# Patient Record
Sex: MALE | Race: WHITE | Employment: UNEMPLOYED | URBAN - METROPOLITAN AREA
[De-identification: names, ages, dates, MRNs, and addresses within clinical notes are randomized per-mention and may not be internally consistent; named-entity substitution may affect disease eponyms.]

---

## 2017-07-10 ENCOUNTER — APPOINTMENT (OUTPATIENT)
Dept: OTHER | Facility: HOSPITAL | Age: 47
End: 2017-07-10
Attending: ORTHOPAEDIC SURGERY

## 2019-08-03 ENCOUNTER — OFFICE VISIT (OUTPATIENT)
Dept: INTERNAL MEDICINE CLINIC | Facility: CLINIC | Age: 49
End: 2019-08-03

## 2019-08-03 VITALS
DIASTOLIC BLOOD PRESSURE: 82 MMHG | HEART RATE: 71 BPM | OXYGEN SATURATION: 99 % | RESPIRATION RATE: 17 BRPM | SYSTOLIC BLOOD PRESSURE: 118 MMHG | BODY MASS INDEX: 33.62 KG/M2 | HEIGHT: 69 IN | WEIGHT: 227 LBS

## 2019-08-03 DIAGNOSIS — R31.29 MICROSCOPIC HEMATURIA: ICD-10-CM

## 2019-08-03 DIAGNOSIS — I10 ESSENTIAL HYPERTENSION: Primary | ICD-10-CM

## 2019-08-03 DIAGNOSIS — E78.2 MIXED HYPERLIPIDEMIA: ICD-10-CM

## 2019-08-03 PROBLEM — R04.0 EPISTAXIS: Status: ACTIVE | Noted: 2019-08-03

## 2019-08-03 LAB
ANION GAP SERPL CALC-SCNC: 7 MMOL/L (ref 0–18)
BUN BLD-MCNC: 20 MG/DL (ref 7–18)
BUN/CREAT SERPL: 20.2 (ref 10–20)
CALCIUM BLD-MCNC: 8.8 MG/DL (ref 8.5–10.1)
CHLORIDE SERPL-SCNC: 107 MMOL/L (ref 98–112)
CHOLEST SMN-MCNC: 208 MG/DL (ref ?–200)
CO2 SERPL-SCNC: 26 MMOL/L (ref 21–32)
CREAT BLD-MCNC: 0.99 MG/DL (ref 0.7–1.3)
GLUCOSE BLD-MCNC: 97 MG/DL (ref 70–99)
HDLC SERPL-MCNC: 44 MG/DL (ref 40–59)
LDLC SERPL CALC-MCNC: 136 MG/DL (ref ?–100)
NONHDLC SERPL-MCNC: 164 MG/DL (ref ?–130)
OSMOLALITY SERPL CALC.SUM OF ELEC: 293 MOSM/KG (ref 275–295)
PATIENT FASTING: YES
PATIENT FASTING: YES
POTASSIUM SERPL-SCNC: 4.1 MMOL/L (ref 3.5–5.1)
SODIUM SERPL-SCNC: 140 MMOL/L (ref 136–145)
TRIGL SERPL-MCNC: 138 MG/DL (ref 30–149)
VLDLC SERPL CALC-MCNC: 28 MG/DL (ref 0–30)

## 2019-08-03 PROCEDURE — 80061 LIPID PANEL: CPT | Performed by: FAMILY MEDICINE

## 2019-08-03 PROCEDURE — 99213 OFFICE O/P EST LOW 20 MIN: CPT | Performed by: FAMILY MEDICINE

## 2019-08-03 PROCEDURE — 80048 BASIC METABOLIC PNL TOTAL CA: CPT | Performed by: FAMILY MEDICINE

## 2019-08-03 RX ORDER — OXYMETAZOLINE HCL 0.05 G/100ML
2 SPRAY NASAL
COMMUNITY
Start: 2019-08-01 | End: 2020-11-04 | Stop reason: ALTCHOICE

## 2019-08-03 RX ORDER — LOVASTATIN 20 MG/1
20 TABLET ORAL NIGHTLY
Qty: 90 TABLET | Refills: 3 | Status: SHIPPED | OUTPATIENT
Start: 2019-08-03 | End: 2020-12-02

## 2019-08-03 RX ORDER — ECHINACEA PURPUREA EXTRACT 125 MG
1 TABLET ORAL
COMMUNITY
Start: 2019-08-01 | End: 2020-11-04 | Stop reason: ALTCHOICE

## 2019-08-03 RX ORDER — ENALAPRIL MALEATE 10 MG/1
10 TABLET ORAL DAILY
Qty: 90 TABLET | Refills: 3 | Status: SHIPPED | OUTPATIENT
Start: 2019-08-03 | End: 2020-10-20

## 2019-08-03 NOTE — PROGRESS NOTES
CC:  ER F/U (Pt presents to clinic for ER f/up-->epistaxis. Was seen @ [facility] then sent to [facility] and admitted. )      Hx of CC:  S/P NOSE BLEED 4 DAYS AGO-->INITIALLY PACKED AT [facility] ER, THEN A DAY LATER BLED AGAIN AND SEEN AT [facility] ER.   CURRENTLY N Date: 8/3/2020      Lipid Panel [E]

## 2019-08-16 ENCOUNTER — OFFICE VISIT (OUTPATIENT)
Dept: INTERNAL MEDICINE CLINIC | Facility: CLINIC | Age: 49
End: 2019-08-16

## 2019-08-16 VITALS
DIASTOLIC BLOOD PRESSURE: 82 MMHG | SYSTOLIC BLOOD PRESSURE: 140 MMHG | BODY MASS INDEX: 33.62 KG/M2 | HEART RATE: 99 BPM | HEIGHT: 69 IN | OXYGEN SATURATION: 98 % | WEIGHT: 227 LBS

## 2019-08-16 DIAGNOSIS — R04.0 EPISTAXIS: Primary | ICD-10-CM

## 2019-08-16 DIAGNOSIS — Z86.69 H/O TENSION HEADACHE: ICD-10-CM

## 2019-08-16 DIAGNOSIS — I10 ESSENTIAL HYPERTENSION: ICD-10-CM

## 2019-08-16 PROCEDURE — 99212 OFFICE O/P EST SF 10 MIN: CPT | Performed by: FAMILY MEDICINE

## 2019-08-17 NOTE — PROGRESS NOTES
CC:  Nose Problem (C/o left sided nostril bleeding. )      Hx of CC:  RESIDUAL MILD BLOOD ON NOSE--NOW ON LEFT SIDE--OFF AND ON (NO FURTHER HEAVY BLEED)  EPISODIC JAW/TEMPORAL ACHE/HEADACHE LATER IN THE AFTERNOONS    Vitals:    08/16/19  1306   BP: 140/82

## 2020-10-20 DIAGNOSIS — I10 ESSENTIAL HYPERTENSION: ICD-10-CM

## 2020-10-20 RX ORDER — ENALAPRIL MALEATE 10 MG/1
10 TABLET ORAL DAILY
Qty: 30 TABLET | Refills: 0 | Status: SHIPPED | OUTPATIENT
Start: 2020-10-20 | End: 2020-11-04

## 2020-11-04 ENCOUNTER — OFFICE VISIT (OUTPATIENT)
Dept: INTERNAL MEDICINE CLINIC | Facility: CLINIC | Age: 50
End: 2020-11-04

## 2020-11-04 VITALS
RESPIRATION RATE: 18 BRPM | DIASTOLIC BLOOD PRESSURE: 100 MMHG | HEIGHT: 69 IN | HEART RATE: 81 BPM | WEIGHT: 238 LBS | SYSTOLIC BLOOD PRESSURE: 152 MMHG | BODY MASS INDEX: 35.25 KG/M2 | OXYGEN SATURATION: 98 %

## 2020-11-04 DIAGNOSIS — Z12.5 SCREENING FOR MALIGNANT NEOPLASM OF PROSTATE: ICD-10-CM

## 2020-11-04 DIAGNOSIS — E78.2 MIXED HYPERLIPIDEMIA: ICD-10-CM

## 2020-11-04 DIAGNOSIS — K64.8 INTERNAL HEMORRHOID: ICD-10-CM

## 2020-11-04 DIAGNOSIS — Z28.21 INFLUENZA VACCINATION DECLINED BY PATIENT: ICD-10-CM

## 2020-11-04 DIAGNOSIS — L29.0 RECTAL ITCHING: ICD-10-CM

## 2020-11-04 DIAGNOSIS — R30.0 DYSURIA: ICD-10-CM

## 2020-11-04 DIAGNOSIS — R31.29 OTHER MICROSCOPIC HEMATURIA: ICD-10-CM

## 2020-11-04 DIAGNOSIS — Z12.11 SCREENING FOR MALIGNANT NEOPLASM OF COLON: ICD-10-CM

## 2020-11-04 DIAGNOSIS — I10 ESSENTIAL HYPERTENSION: Primary | ICD-10-CM

## 2020-11-04 DIAGNOSIS — Z00.00 ADULT GENERAL MEDICAL EXAMINATION: ICD-10-CM

## 2020-11-04 PROCEDURE — 87086 URINE CULTURE/COLONY COUNT: CPT | Performed by: NURSE PRACTITIONER

## 2020-11-04 PROCEDURE — 81003 URINALYSIS AUTO W/O SCOPE: CPT | Performed by: NURSE PRACTITIONER

## 2020-11-04 PROCEDURE — 3008F BODY MASS INDEX DOCD: CPT | Performed by: NURSE PRACTITIONER

## 2020-11-04 PROCEDURE — 3077F SYST BP >= 140 MM HG: CPT | Performed by: NURSE PRACTITIONER

## 2020-11-04 PROCEDURE — 99214 OFFICE O/P EST MOD 30 MIN: CPT | Performed by: NURSE PRACTITIONER

## 2020-11-04 PROCEDURE — 3080F DIAST BP >= 90 MM HG: CPT | Performed by: NURSE PRACTITIONER

## 2020-11-04 RX ORDER — AMLODIPINE BESYLATE 5 MG/1
5 TABLET ORAL DAILY
Qty: 30 TABLET | Refills: 1 | Status: SHIPPED | OUTPATIENT
Start: 2020-11-04 | End: 2020-12-02

## 2020-11-04 RX ORDER — ENALAPRIL MALEATE 10 MG/1
10 TABLET ORAL DAILY
Qty: 90 TABLET | Refills: 0 | Status: SHIPPED | OUTPATIENT
Start: 2020-11-04 | End: 2021-02-17

## 2020-11-04 NOTE — PROGRESS NOTES
Chief Complaint:   Patient presents with:  HTN: Pt presents to clinic for HTN and medication refill. Lab: Would like PSA levels checked as he has never had it. Rectal Problem: Itching x months.        HPI:   This is a 48year old male coming in for [facility] Date   • Other and unspecified hyperlipidemia      Past Surgical History:   Procedure Laterality Date   • OTHER      right arm surgery     Social History:  Social History    Tobacco Use      Smoking status: Never Smoker      Smokeless tobacco: Never Used syncope, ataxia, numbness or tingling in the extremities,change in bowel or bladder control. HEMATOLOGIC:  Denies anemia, bleeding or bruising, enlarged lymph nodes. PSYCHIATRIC:  Denies depression or anxiety. Denies suicidal thoughts.   ENDOCRINOLOGIC: hypertension  -BP above goal. Recheck BP similar. Will add amlodipine 5mg PO daily and continue enalapril. Patient to return in 2-3 weeks for recheck. Side effects of amlodipine discussed. -Recommended low-salt diet, regular exercise.   - CBC WITH ANNA 25 MG Rectal Suppos 28 suppository 0     Sig: Place 1 suppository (25 mg total) rectally 2 (two) times daily for 14 days.          Problem List:  Patient Active Problem List:     Mixed hyperlipidemia     Essential hypertension     Epistaxis     H/O tension

## 2020-11-04 NOTE — PATIENT INSTRUCTIONS
-Amlodipine 1 tablet by mouth every morning  -Follow-up in 2-3 weeks so we can check your blood pressure  -Anusol suppository twice daily x 2 weeks for rectal itching/hemorrhoids  -Prevent constipation: Drink plenty of water, eat fiber in the diet (fruit Nathan last reviewed this educational content on 11/1/2019  © 0348-5408 [employer]. 140[address] [facility], [facility] [place]. All rights reserved. This information is not intended as a substitute for professional medical care.  Always foll

## 2020-11-09 ENCOUNTER — TELEPHONE (OUTPATIENT)
Dept: INTERNAL MEDICINE CLINIC | Facility: CLINIC | Age: 50
End: 2020-11-09

## 2020-11-09 NOTE — TELEPHONE ENCOUNTER
patient called back    informed URINE test results    Voiced understanding. No questions at this time.

## 2020-12-02 ENCOUNTER — OFFICE VISIT (OUTPATIENT)
Dept: INTERNAL MEDICINE CLINIC | Facility: CLINIC | Age: 50
End: 2020-12-02

## 2020-12-02 VITALS
RESPIRATION RATE: 17 BRPM | SYSTOLIC BLOOD PRESSURE: 122 MMHG | BODY MASS INDEX: 34.66 KG/M2 | OXYGEN SATURATION: 98 % | HEIGHT: 69 IN | WEIGHT: 234 LBS | DIASTOLIC BLOOD PRESSURE: 84 MMHG | HEART RATE: 93 BPM

## 2020-12-02 DIAGNOSIS — I10 ESSENTIAL HYPERTENSION: ICD-10-CM

## 2020-12-02 DIAGNOSIS — Z28.21 INFLUENZA VACCINATION DECLINED BY PATIENT: Primary | ICD-10-CM

## 2020-12-02 DIAGNOSIS — Z00.00 ADULT GENERAL MEDICAL EXAMINATION: ICD-10-CM

## 2020-12-02 DIAGNOSIS — Z12.5 SCREENING FOR MALIGNANT NEOPLASM OF PROSTATE: ICD-10-CM

## 2020-12-02 DIAGNOSIS — E78.2 MIXED HYPERLIPIDEMIA: ICD-10-CM

## 2020-12-02 PROCEDURE — 3079F DIAST BP 80-89 MM HG: CPT | Performed by: NURSE PRACTITIONER

## 2020-12-02 PROCEDURE — 80050 GENERAL HEALTH PANEL: CPT | Performed by: NURSE PRACTITIONER

## 2020-12-02 PROCEDURE — 80061 LIPID PANEL: CPT | Performed by: NURSE PRACTITIONER

## 2020-12-02 PROCEDURE — 3008F BODY MASS INDEX DOCD: CPT | Performed by: NURSE PRACTITIONER

## 2020-12-02 PROCEDURE — 84153 ASSAY OF PSA TOTAL: CPT | Performed by: NURSE PRACTITIONER

## 2020-12-02 PROCEDURE — 3074F SYST BP LT 130 MM HG: CPT | Performed by: NURSE PRACTITIONER

## 2020-12-02 PROCEDURE — 99213 OFFICE O/P EST LOW 20 MIN: CPT | Performed by: NURSE PRACTITIONER

## 2020-12-02 RX ORDER — AMLODIPINE BESYLATE 5 MG/1
5 TABLET ORAL DAILY
Qty: 90 TABLET | Refills: 1 | Status: SHIPPED | OUTPATIENT
Start: 2020-12-02 | End: 2021-02-17

## 2020-12-02 RX ORDER — LOVASTATIN 20 MG/1
20 TABLET ORAL NIGHTLY
Qty: 90 TABLET | Refills: 1 | Status: SHIPPED | OUTPATIENT
Start: 2020-12-02 | End: 2021-09-21

## 2020-12-02 NOTE — PROGRESS NOTES
Chief Complaint:   Patient presents with: Follow - Up: Pt presents to clinic for 3wk f/up. Fasting for labs. HPI:   This is a 48year old male coming in for follow-up on hypertension.  He started amlodipine 4 weeks ago and BP is much better-controlle tablet 1   • amLODIPine Besylate 5 MG Oral Tab Take 1 tablet (5 mg total) by mouth daily. 90 tablet 1   • Enalapril Maleate 10 MG Oral Tab Take 1 tablet (10 mg total) by mouth daily.  90 tablet 0      Counseling given: Not Answered       REVIEW OF SYSTEMS: months.  -Lifestyle modifications reviewed with patient including low salt/low fat diet, exercise regularly. - CBC WITH DIFFERENTIAL WITH PLATELET  - COMP METABOLIC PANEL (14)  - amLODIPine Besylate 5 MG Oral Tab;  Take 1 tablet (5 mg total) by mouth john

## 2020-12-02 NOTE — PATIENT INSTRUCTIONS
-Preparation H over-the-counter as needed for rectal itching/hemorrhoids    Alimentos con bajo contenido de sal  El consumo de sal (sodio) puede causar pola retención excesiva de agua. El agua adicional obliga al corazón a trabajar más.  Las comidas en con · Pretzels, kapil fritas, galletas saladas y nasir secos (a menos que la etiqueta indique que no contienen cordell)    © 0888-8970 [employer] [address]. 1407 [facility], [address]. Todos los derechos reservados.  Esta información no pretend ? Raf de Hussain. Un baño de asiento consiste en sentarse en pola meryl que contiene algunas pulgadas de agua tibia. Tenga cuidado de que el agua no esté quick caliente que pueda quemarse. (Pruébela antes de sentarse).  Empape la feli afectada de unos 10 a 15 Cuándo debe buscar atención médica  Llame a valencia proveedor de atención médica de inmediato si presenta alguno de estos síntomas:  · Aumento del sangrado del recto  · Más dolor alrededor del recto o el ano  · Debilidad o mareos  Llame al 911  Llame al 911ante · Adopte buenos hábitos intestinales. Vaya al baño cuando valencia cuerpo lo necesita; no ignore las ganas de ir, ya que posponer la evacuación puede provocar estreñimiento, heces duras y esfuerzos excesivos. Además, evite leer mientras esté en el inodoro.  Siént · La fibra soluble, que está presente en ciertos alimentos ella el salvado de darnell. Aunque la fibra soluble es beneficiosa, quizás no alivie el estreñimiento en la misma medida que los alimentos que contienen fibra insoluble.   Annie más agua  Junto con Sandy Galicia · Comience el día con un desayuno rico en fibra. Coma cereal de salvado de clarke junto con un plátano rebanado, o pola tostada de clarke integral con mantequilla de cacahuete.   · Coma palitos de zanahorias ella bocadillos: son fáciles de preparar, muy sabros

## 2021-02-16 ENCOUNTER — TELEPHONE (OUTPATIENT)
Dept: INTERNAL MEDICINE CLINIC | Facility: CLINIC | Age: 51
End: 2021-02-16

## 2021-02-16 DIAGNOSIS — I10 ESSENTIAL HYPERTENSION: ICD-10-CM

## 2021-02-16 NOTE — TELEPHONE ENCOUNTER
Patient is calling for a medication refill on Enalapril Maleate 10 MG Oral Tab and amLODIPine Besylate 5 MG Oral Tab as he is on his last two pills for each medication.  Patient would like a call back if this is possible as he doesn't have a f/u appointment

## 2021-02-17 RX ORDER — AMLODIPINE BESYLATE 5 MG/1
5 TABLET ORAL DAILY
Qty: 90 TABLET | Refills: 1 | Status: SHIPPED | OUTPATIENT
Start: 2021-02-17 | End: 2021-09-21 | Stop reason: ALTCHOICE

## 2021-02-17 RX ORDER — ENALAPRIL MALEATE 10 MG/1
10 TABLET ORAL DAILY
Qty: 90 TABLET | Refills: 0 | Status: SHIPPED | OUTPATIENT
Start: 2021-02-17 | End: 2021-06-10

## 2021-03-31 ENCOUNTER — OFFICE VISIT (OUTPATIENT)
Dept: FAMILY MEDICINE CLINIC | Facility: CLINIC | Age: 51
End: 2021-03-31

## 2021-03-31 VITALS
HEART RATE: 96 BPM | SYSTOLIC BLOOD PRESSURE: 130 MMHG | RESPIRATION RATE: 16 BRPM | HEIGHT: 68 IN | BODY MASS INDEX: 34.86 KG/M2 | OXYGEN SATURATION: 98 % | DIASTOLIC BLOOD PRESSURE: 88 MMHG | WEIGHT: 230 LBS | TEMPERATURE: 96 F

## 2021-03-31 DIAGNOSIS — J02.9 SORE THROAT: Primary | ICD-10-CM

## 2021-03-31 DIAGNOSIS — J02.0 ACUTE STREPTOCOCCAL PHARYNGITIS: ICD-10-CM

## 2021-03-31 LAB
CONTROL LINE PRESENT WITH A CLEAR BACKGROUND (YES/NO): YES YES/NO
KIT LOT #: ABNORMAL NUMERIC
STREP GRP A CUL-SCR: POSITIVE

## 2021-03-31 PROCEDURE — 87880 STREP A ASSAY W/OPTIC: CPT | Performed by: PHYSICIAN ASSISTANT

## 2021-03-31 PROCEDURE — 99213 OFFICE O/P EST LOW 20 MIN: CPT | Performed by: PHYSICIAN ASSISTANT

## 2021-03-31 PROCEDURE — 3079F DIAST BP 80-89 MM HG: CPT | Performed by: PHYSICIAN ASSISTANT

## 2021-03-31 PROCEDURE — 3008F BODY MASS INDEX DOCD: CPT | Performed by: PHYSICIAN ASSISTANT

## 2021-03-31 PROCEDURE — 3075F SYST BP GE 130 - 139MM HG: CPT | Performed by: PHYSICIAN ASSISTANT

## 2021-03-31 RX ORDER — AMOXICILLIN 875 MG/1
875 TABLET, COATED ORAL 2 TIMES DAILY
Qty: 20 TABLET | Refills: 0 | Status: SHIPPED | OUTPATIENT
Start: 2021-03-31 | End: 2021-04-10

## 2021-03-31 NOTE — PATIENT INSTRUCTIONS
Faringitis por estreptococos (confirmada)    Woodruff prueba farzaneh positiva a la infección por estreptococos. Se trata de pola enfermedad contagiosa.  Se puede contagiar al toser, al besar, al compartir vasos o cubiertos o al tocar a otras personas después de habe útil hacerlo antes de las comidas.   · Lo mejor son los alimentos blandos y los líquidos fríos o calientes. No consuma alimentos salados ni picantes.     Visitas de control  Asista a visitas de seguimiento con valencia proveedor de atención médica o nuestro perso

## 2021-03-31 NOTE — PROGRESS NOTES
CHIEF COMPLAINT:   Patient presents with:  Sore Throat    HPI:   Ashvin Guerrero is a 46year old male presents to clinic with complaint of sore throat. Patient has had for 3-4 days.    Reports: no nasal congestion, no cough  Reports no chills, no fe nostrils patent, no nasal mucus, nasal mucosa pink and  Non inflamed  THROAT: oral mucosa pink, moist. Posterior pharynx erythematous and injected. No exudates. Tonsils 2+/4. Uvula is midline. No trismus, hoarseness, muffled voice, or stridor.     NECK: valencia después de haberse tocado la boca o la nariz.  Los síntomas incluyen dolor de garganta que empeora al tragar, dolor en todo el cuerpo, dolor de lindsey, ganglios linfáticos inflamados en la parte de adelante del angel, amígdalas degroot e inflamadas, a veces o nuestro personal médico si no empieza a sentirse mejor ray la semana próxima.    Cuándo buscar atención médica  Llame al proveedor de atención médica o solicite atención médica de inmediato en cualquiera de los siguientes casos:   · Ja de 100,4 °F

## 2021-04-28 ENCOUNTER — HOSPITAL ENCOUNTER (INPATIENT)
Facility: HOSPITAL | Age: 51
LOS: 8 days | Discharge: HOME | DRG: 177 | End: 2021-05-06
Attending: EMERGENCY MEDICINE | Admitting: HOSPITALIST
Payer: OTHER GOVERNMENT

## 2021-04-28 ENCOUNTER — APPOINTMENT (OUTPATIENT)
Dept: GENERAL RADIOLOGY | Facility: HOSPITAL | Age: 51
DRG: 177 | End: 2021-04-28
Attending: EMERGENCY MEDICINE
Payer: OTHER GOVERNMENT

## 2021-04-28 DIAGNOSIS — U07.1 PNEUMONIA DUE TO 2019 NOVEL CORONAVIRUS: Primary | ICD-10-CM

## 2021-04-28 DIAGNOSIS — R09.02 HYPOXIA: ICD-10-CM

## 2021-04-28 DIAGNOSIS — J12.82 PNEUMONIA DUE TO 2019 NOVEL CORONAVIRUS: Primary | ICD-10-CM

## 2021-04-28 PROCEDURE — XW0DXM6 INTRODUCTION OF BARICITINIB INTO MOUTH AND PHARYNX, EXTERNAL APPROACH, NEW TECHNOLOGY GROUP 6: ICD-10-PCS | Performed by: INTERNAL MEDICINE

## 2021-04-28 PROCEDURE — 71045 X-RAY EXAM CHEST 1 VIEW: CPT | Performed by: EMERGENCY MEDICINE

## 2021-04-28 PROCEDURE — 3E0333Z INTRODUCTION OF ANTI-INFLAMMATORY INTO PERIPHERAL VEIN, PERCUTANEOUS APPROACH: ICD-10-PCS | Performed by: EMERGENCY MEDICINE

## 2021-04-28 PROCEDURE — 99223 1ST HOSP IP/OBS HIGH 75: CPT | Performed by: HOSPITALIST

## 2021-04-28 PROCEDURE — 99222 1ST HOSP IP/OBS MODERATE 55: CPT | Performed by: INTERNAL MEDICINE

## 2021-04-28 PROCEDURE — XW033E5 INTRODUCTION OF REMDESIVIR ANTI-INFECTIVE INTO PERIPHERAL VEIN, PERCUTANEOUS APPROACH, NEW TECHNOLOGY GROUP 5: ICD-10-PCS | Performed by: EMERGENCY MEDICINE

## 2021-04-28 RX ORDER — DEXAMETHASONE SODIUM PHOSPHATE 10 MG/ML
6 INJECTION, SOLUTION INTRAMUSCULAR; INTRAVENOUS ONCE
Status: COMPLETED | OUTPATIENT
Start: 2021-04-28 | End: 2021-04-28

## 2021-04-28 RX ORDER — ZINC SULFATE 50(220)MG
220 CAPSULE ORAL 2 TIMES DAILY
Status: DISCONTINUED | OUTPATIENT
Start: 2021-04-28 | End: 2021-05-06

## 2021-04-28 RX ORDER — PROCHLORPERAZINE EDISYLATE 5 MG/ML
5 INJECTION INTRAMUSCULAR; INTRAVENOUS EVERY 8 HOURS PRN
Status: DISCONTINUED | OUTPATIENT
Start: 2021-04-28 | End: 2021-05-06

## 2021-04-28 RX ORDER — DEXAMETHASONE SODIUM PHOSPHATE 4 MG/ML
6 VIAL (ML) INJECTION DAILY
Status: DISCONTINUED | OUTPATIENT
Start: 2021-04-28 | End: 2021-05-06

## 2021-04-28 RX ORDER — ENOXAPARIN SODIUM 100 MG/ML
40 INJECTION SUBCUTANEOUS DAILY
Status: DISCONTINUED | OUTPATIENT
Start: 2021-04-28 | End: 2021-05-06

## 2021-04-28 RX ORDER — ACETAMINOPHEN 325 MG/1
650 TABLET ORAL EVERY 6 HOURS PRN
Status: DISCONTINUED | OUTPATIENT
Start: 2021-04-28 | End: 2021-05-06

## 2021-04-28 RX ORDER — GUAIFENESIN 600 MG
600 TABLET, EXTENDED RELEASE 12 HR ORAL 2 TIMES DAILY
Status: DISCONTINUED | OUTPATIENT
Start: 2021-04-28 | End: 2021-05-01

## 2021-04-28 RX ORDER — BENZONATATE 100 MG/1
200 CAPSULE ORAL 3 TIMES DAILY PRN
Status: DISCONTINUED | OUTPATIENT
Start: 2021-04-28 | End: 2021-05-06

## 2021-04-28 RX ORDER — DEXAMETHASONE 6 MG/1
6 TABLET ORAL DAILY
Status: DISCONTINUED | OUTPATIENT
Start: 2021-04-28 | End: 2021-05-06

## 2021-04-28 RX ORDER — ONDANSETRON 2 MG/ML
4 INJECTION INTRAMUSCULAR; INTRAVENOUS EVERY 6 HOURS PRN
Status: DISCONTINUED | OUTPATIENT
Start: 2021-04-28 | End: 2021-05-06

## 2021-04-28 RX ORDER — ALBUTEROL SULFATE 90 UG/1
4 AEROSOL, METERED RESPIRATORY (INHALATION) EVERY 4 HOURS PRN
Status: DISCONTINUED | OUTPATIENT
Start: 2021-04-28 | End: 2021-05-06

## 2021-04-28 RX ORDER — ASCORBIC ACID 500 MG
1000 TABLET ORAL DAILY
Status: DISCONTINUED | OUTPATIENT
Start: 2021-04-28 | End: 2021-05-06

## 2021-04-28 NOTE — ED PROVIDER NOTES
Patient Seen in: [facility] AND [facility] Emergency Department      History   Patient presents with:  Difficulty Breathing  Covid    Stated Complaint: sob    HPI/Subjective:   HPI    49-year-old male with history of hyperlipidemia and hypertension presents with sounds normal  Neurological: Speech normal.  Moving extremities x4. Skin: warm and dry, no rashes. Musculoskeletal: neck is supple non tender        Extremities are symmetrical, full range of motion. No leg edema or tenderness noted.   Psychiatric: patie Discussed with Dr. Neymar Quintero, hospitalist.  Also discussed with Dr. Nhan Haynes, pulmonology. Also discussed with Dr. Yenny Knight, infectious disease.     I spent a total of 27 minutes of critical care time in obtaining history, performing a physical exam, bedside

## 2021-04-29 PROCEDURE — 99233 SBSQ HOSP IP/OBS HIGH 50: CPT | Performed by: PHYSICIAN ASSISTANT

## 2021-04-29 PROCEDURE — 99233 SBSQ HOSP IP/OBS HIGH 50: CPT | Performed by: HOSPITALIST

## 2021-04-29 PROCEDURE — XW033H5 INTRODUCTION OF TOCILIZUMAB INTO PERIPHERAL VEIN, PERCUTANEOUS APPROACH, NEW TECHNOLOGY GROUP 5: ICD-10-PCS | Performed by: PHYSICIAN ASSISTANT

## 2021-04-29 RX ORDER — AMLODIPINE BESYLATE 5 MG/1
5 TABLET ORAL DAILY
Status: DISCONTINUED | OUTPATIENT
Start: 2021-04-29 | End: 2021-04-29

## 2021-04-29 RX ORDER — ENALAPRIL MALEATE 10 MG/1
10 TABLET ORAL DAILY
Status: DISCONTINUED | OUTPATIENT
Start: 2021-04-29 | End: 2021-05-06

## 2021-04-29 RX ORDER — ECHINACEA PURPUREA EXTRACT 125 MG
1 TABLET ORAL
Status: DISCONTINUED | OUTPATIENT
Start: 2021-04-29 | End: 2021-05-06

## 2021-04-29 RX ORDER — AMLODIPINE BESYLATE 5 MG/1
5 TABLET ORAL DAILY
Status: DISCONTINUED | OUTPATIENT
Start: 2021-04-29 | End: 2021-05-06

## 2021-04-29 NOTE — CONSULTS
[facility] ID CONSULT NOTE    Amber Mention Patient Status:  Inpatient    3/15/1970 MRN [id number]   Location [facility] Attending Elder Veras MD   Hosp Day # 1 PCP Angela Lewis DO       [facility] for [facility] SERVICES, [facility] (ZINCATE) cap 220 mg, 220 mg, Oral, BID  •  Vitamin D3 (Cholecalciferol) cap/tab 2,000 Units, 2,000 Units, Oral, Daily  •  ascorbic acid (VITAMIN C) tab 1,000 mg, 1,000 mg, Oral, Daily  •  dexamethasone (DECADRON) tab 6 mg, 6 mg, Oral, Daily **OR** dexamet 0.4 0.4   TP 7.8 7.7       Microbiology: Reviewed in EMR    Radiology: Reviewed    ASSESSMENT:    Antibiotics: OFF    46year old , [address] speaking male with a history of obesity, HTN who presented to [address] [place] [address] ED on 4/28 with increased shortness of jerod

## 2021-04-29 NOTE — ED QUICK NOTES
Orders for admission, patient is aware of plan and ready to go upstairs. LOC: AOx3. Proned on 10L HFNC.    COVID positive 4/24/21    ACCESS: 18g left AC    Any questions, please call ED RN Michelle Dunne  at extension [phone]

## 2021-04-29 NOTE — PLAN OF CARE
Pt receiving 10-15L HFNC. Pt short of breath with exertion, HR going up with exertion. Desats down to mid 70's when standing up and walking. Recovers upon resting. Therapeutic proning position done, and O2 sats improved and was 90's.  Encouraged use of sharonda

## 2021-04-29 NOTE — PLAN OF CARE
Pt from home with family. 10L O2 per HFNC. Iv remdesiver, decadron, and baricitnib started, no CCP per pulm. PT prones as tolerated.  PT severely     Problem: Patient Centered Care  Goal: Patient preferences are identified and integrated in the patient's p retention  Outcome: Not Progressing     Problem: PAIN - ADULT  Goal: Verbalizes/displays adequate comfort level or patient's stated pain goal  Description: INTERVENTIONS:  - Encourage pt to monitor pain and request assistance  - Assess pain using appropria Progressing

## 2021-04-29 NOTE — PLAN OF CARE
IV remdesiver, decadron, and baricitnib started. No CCP per pulm.  PT gets very short of breath with any exertion including moving in bed and coughing- pulse ox 73% lowest. 10L O2 on 88-93% at rest.      Problem: Patient Centered Care  Goal: Patient prefere Cessation handout, if applicable  - Encourage broncho-pulmonary hygiene including cough, deep breathe, Incentive Spirometry  - Assess the need for suctioning and perform as needed  - Assess and instruct to report SOB or any respiratory difficulty  - Respir partner in discharge planning  - Arrange for needed discharge resources and transportation as appropriate  - Identify discharge learning needs (meds, wound care, etc)  - Arrange for interpreters to assist at discharge as needed  - Consider post-discharge p

## 2021-04-29 NOTE — PROGRESS NOTES
[facility]    Progress Note    Queenie Snyder Patient Status:  Inpatient    3/15/1970 MRN [id number]   Location [facility] Attending Karla Astudillo MD   Hosp Day # 1 PCP Mami Bryan DO     HPI/Subjective:   Seen and exa 04/28/2021       XR CHEST AP PORTABLE  (CPT=71045)    Result Date: 4/28/2021  CONCLUSION:   Bilateral upper and lower lobe airspace opacities.   Findings may be secondary to pulmonary edema or multifocal pneumonia (either from bacterial or atypical viral et Clinic Pulmonary Medicine  4/29/2021

## 2021-04-29 NOTE — PROGRESS NOTES
[facility]    Progress Note    Aixa Dempsey Patient Status:  Inpatient    3/15/1970 MRN [id number]   Location [facility] Attending Ninoska Peña MD   Hosp Day # 1 PCP Etelvina Hurley DO       Subjective:   César Whyte Rashmi Aviles MD on 4/28/2021 at 6:42 PM          EKG 12-LEAD    Result Date: 4/28/2021  ECG Report  Interpretation  -------------------------- Sinus Tachycardia -Nonspecific ST depression -Nondiagnostic.  ABNORMAL No previous ECGs available Electronically s

## 2021-04-29 NOTE — H&P
[facility]    PATIENT'S NAME: Alexis Campo   ATTENDING PHYSICIAN: Marija Adler MD   PATIENT ACCOUNT#:   [id number]    LOCATION:  [address] [place] [address],[address], [address] RECORD #:   [id number]       YOB: 1970  ADMISSION DATE:       04/28/2 oriented to time, place, and person. Moderate distress. VITAL SIGNS:  Temperature 100.4, pulse 117, respiratory rate 34, blood pressure 139/87, pulse ox 84% on room air and 97% on high-flow nasal cannula oxygen and prone positioning.    HEENT:  Atraumat

## 2021-04-29 NOTE — PROGRESS NOTES
[facility] Pharmacy Note:  Bruna Talavera is a 46year old patient admitted 4/28/2021  5:47 PM who is COVID (+) and has been been started on Remdesivir as of 4/28.     Pertinent Patient Lab Values:  Estimated GFR:   Recent Labs     04/28/21  18

## 2021-04-29 NOTE — CONSULTS
[facility]    Report of Consultation    Kathryn Lopze Patient Status:  Inpatient    3/15/1970 MRN [id number]   Location [address] [facility] Attending Dell Cain MD   Hosp Day # 0 PCP Hamilton Murrell DO     Date of Admission:   Intravenous, Q6H PRN  Prochlorperazine Edisylate (COMPAZINE) injection 5 mg, 5 mg, Intravenous, Q8H PRN  sodium chloride 0.9% IV bolus 500 mL, 500 mL, Intravenous, PRN  guaiFENesin ER (MUCINEX) 12 hr tab 600 mg, 600 mg, Oral, BID  Albuterol Sulfate  acute distress  Eyes: PERRL  ENT: nares patent  Neck: neck supple, no JVD  Cardio: RRR, S1 S2  Respiratory: c bilateral crackles  GI: abdomen soft, non tender, active bowel souds, no organomegaly  Extremities: no clubbing, cyanosis, edema  Neurologic: no g labs and imaging.     Marcy Comas, DO  Pulmonary [address] [place] [address]  4/28/2021  10:10 PM

## 2021-04-30 ENCOUNTER — APPOINTMENT (OUTPATIENT)
Dept: GENERAL RADIOLOGY | Facility: HOSPITAL | Age: 51
DRG: 177 | End: 2021-04-30
Attending: PHYSICIAN ASSISTANT
Payer: OTHER GOVERNMENT

## 2021-04-30 PROCEDURE — 99233 SBSQ HOSP IP/OBS HIGH 50: CPT | Performed by: INTERNAL MEDICINE

## 2021-04-30 PROCEDURE — 71045 X-RAY EXAM CHEST 1 VIEW: CPT | Performed by: PHYSICIAN ASSISTANT

## 2021-04-30 PROCEDURE — 99233 SBSQ HOSP IP/OBS HIGH 50: CPT | Performed by: HOSPITALIST

## 2021-04-30 NOTE — PLAN OF CARE
AOX 4. Pt on 15L O2 via high-flow nasal cannula. Reports SOB with exertion. Pt drops to 77% with activity. Proned x 3 this shift.      Problem: RESPIRATORY - ADULT  Goal: Achieves optimal ventilation and oxygenation  Description: INTERVENTIONS:  - Assess

## 2021-04-30 NOTE — PROGRESS NOTES
[facility]    Progress Note    Jami Yin Patient Status:  Inpatient    3/15/1970 MRN [id number]   Location [facility] Attending Marissa Aviles MD   Hosp Day # 2 PCP Maday Alexandra DO       Subjective:   Forrest Mallory 4/28/2021  CONCLUSION:   Bilateral upper and lower lobe airspace opacities. Findings may be secondary to pulmonary edema or multifocal pneumonia (either from bacterial or atypical viral etiology).   Continued follow-up imaging recommended to ensure resolut

## 2021-04-30 NOTE — PROGRESS NOTES
INFECTIOUS DISEASE PROGRESS NOTE  [facility] TELEMEDICINE PROGRESS NOTE    Sahara Joseph Patient Status:  Inpatient    3/15/1970 MRN [id number]   Location [address] [place] [address] Attending Nik Chaves MD   Hosp Day 4/28               - RDV 4/28               - CCP on hold, s/p J&J vaccine 4/8               - Actemra 4/29               - Abx off  # Obesity  # HTN       PLAN:  -  Continue on RDV, day 3.   -  Decadron.  -  isolation per protocol.  -  Follow fever curve,

## 2021-04-30 NOTE — PROGRESS NOTES
Pulmonary/Critical Care Follow Up Note    HPI:   Governor Jc is a 46year old male with Patient presents with:  Difficulty Breathing  Covid      PCP Maximiliano Anthony DO  Admission Attending Shaina Ayala MD    Hospital Day #2    Sob  Severe cough hours) at 4/30/2021 1657  Last data filed at 4/30/2021 1058  Gross per 24 hour   Intake 375 ml   Output 750 ml   Net -375 ml     Mild ill appearing  Head AT/NC  Lung few crackles and mild inc wob  abd soft +bs no mass  cv reg tachy nl S1S2  Ext no edema

## 2021-05-01 PROCEDURE — 99233 SBSQ HOSP IP/OBS HIGH 50: CPT | Performed by: HOSPITALIST

## 2021-05-01 PROCEDURE — 99232 SBSQ HOSP IP/OBS MODERATE 35: CPT | Performed by: INTERNAL MEDICINE

## 2021-05-01 RX ORDER — MELATONIN
3 NIGHTLY
Status: DISCONTINUED | OUTPATIENT
Start: 2021-05-01 | End: 2021-05-06

## 2021-05-01 RX ORDER — LORAZEPAM 2 MG/ML
INJECTION INTRAMUSCULAR
Status: DISPENSED
Start: 2021-05-01 | End: 2021-05-01

## 2021-05-01 RX ORDER — CODEINE PHOSPHATE AND GUAIFENESIN 10; 100 MG/5ML; MG/5ML
5 SOLUTION ORAL EVERY 4 HOURS PRN
Status: DISCONTINUED | OUTPATIENT
Start: 2021-05-01 | End: 2021-05-06

## 2021-05-01 RX ORDER — FAMOTIDINE 20 MG/1
20 TABLET ORAL 2 TIMES DAILY
Status: DISCONTINUED | OUTPATIENT
Start: 2021-05-01 | End: 2021-05-06

## 2021-05-01 RX ORDER — LORAZEPAM 2 MG/ML
1 INJECTION INTRAMUSCULAR 2 TIMES DAILY PRN
Status: DISCONTINUED | OUTPATIENT
Start: 2021-05-01 | End: 2021-05-06

## 2021-05-01 NOTE — PROGRESS NOTES
[facility]    Progress Note    Mirnageorgette Montoya Patient Status:  Inpatient    3/15/1970 MRN [id number]   Location [facility] Attending Donavan Cisneros MD   Hosp Day # 3 PCP Pat Muhammad DO     CC: SOB  Subjective:   Ryan Hutchison Or   • dexamethasone Sodium Phosphate  6 mg Intravenous Daily   • remdesivir  100 mg Intravenous Q24H       Current PRN Inpatient Meds:      LORazepam, Saline Nasal Spray, ondansetron HCl, Prochlorperazine Edisylate, sodium chloride 0.9%, Albuterol Sulf spo2 99% on this try to wean.    - Cont dexamethasone.   -cont pepcid, zinc, melatonin  -Remdesivir Day #4/5   - s/p Actemra 4/29.   - Not candidate for CCP.   - s/p J&J vaccine 4/8   -daily labs including inflammatory markers trending down.   -monitor lfts

## 2021-05-01 NOTE — PLAN OF CARE
Problem: Patient Centered Care  Goal: Patient preferences are identified and integrated in the patient's plan of care  Description: Interventions:  - What would you like us to know as we care for you?  Home with family  - Provide timely, complete, and acc Encourage pt to monitor pain and request assistance  - Assess pain using appropriate pain scale  - Administer analgesics based on type and severity of pain and evaluate response  - Implement non-pharmacological measures as appropriate and evaluate response

## 2021-05-01 NOTE — PROGRESS NOTES
[facility]     Progress Note        Princella Payer Patient Status:  Inpatient    3/15/1970 MRN [id number]   Location [address] [facility] Attending Mateo Hamlin MD   Hosp Day # 3 PCP Samuel Ludwig DO       Subjective:   Patient se injection 6 mg, 6 mg, Intravenous, Daily  benzonatate (TESSALON) cap 200 mg, 200 mg, Oral, TID PRN  remdesivir 100 mg in sodium chloride 0.9% 270 mL IVPB, 100 mg, Intravenous, Q24H  acetaminophen (TYLENOL) tab 650 mg, 650 mg, Oral, Q6H PRN        Continuou positioning  -Continue to follow inflammatory markers  -DVT prophylaxis: Lovenox    Ken Urrutia, DO  Pulmonary [address]

## 2021-05-01 NOTE — PLAN OF CARE
Pt. Still on 15L O2 via high-flow nasal cannula saturating from low to mid-90s at rest.  Reports SOB with activity. Proned x 1 during shift   Pt. Given 1 mg IV Ativan for anxiety.      Problem: Patient Centered Care  Goal: Patient preferences are identifi signs/symptoms of CO2 retention  Outcome: Progressing     Problem: PAIN - ADULT  Goal: Verbalizes/displays adequate comfort level or patient's stated pain goal  Description: INTERVENTIONS:  - Encourage pt to monitor pain and request assistance  - Assess pa Progressing

## 2021-05-02 PROCEDURE — 99232 SBSQ HOSP IP/OBS MODERATE 35: CPT | Performed by: INTERNAL MEDICINE

## 2021-05-02 PROCEDURE — 99233 SBSQ HOSP IP/OBS HIGH 50: CPT | Performed by: HOSPITALIST

## 2021-05-02 NOTE — PROGRESS NOTES
[facility]    Progress Note    Jyoti Klein Patient Status:  Inpatient    3/15/1970 MRN [id number]   Location [address] [facility] ED Attending Lori Adan MD   Hosp Day # 4 PCP Latia Balbuena DO     CC: SOB  Subjective:   Rebeka Anthony Phosphate  6 mg Intravenous Daily   • remdesivir  100 mg Intravenous Q24H       Current PRN Inpatient Meds:      LORazepam, guaiFENesin-codeine, Saline Nasal Spray, ondansetron HCl, Prochlorperazine Edisylate, sodium chloride 0.9%, Albuterol Sulfate HFA, b labs including inflammatory markers trending down.   -monitor lfts on remdesivir trending down.   -Cont Lovenox for VTe prophylaxis. - ID and Pulm following.  - therapeutic proning. HTN  - Cont amlodipine and enalapril.      Hyperlipidemia  - resume s

## 2021-05-02 NOTE — PLAN OF CARE
A&Ox4, 10L HF NC, remdesivir given, educated on deep breathing techniques and proning. Ativan given for anxiety. Bed locked/lowest position and call light within reach.    Problem: Patient Centered Care  Goal: Patient preferences are identified and integrat retention  Outcome: Progressing     Problem: PAIN - ADULT  Goal: Verbalizes/displays adequate comfort level or patient's stated pain goal  Description: INTERVENTIONS:  - Encourage pt to monitor pain and request assistance  - Assess pain using appropriate p

## 2021-05-02 NOTE — PLAN OF CARE
Continue oxygen HFNC, wean as tolerated. PRN ativan for anxiety. Educated for proning. No new complaints. Family updated.     Problem: Patient Centered Care  Goal: Patient preferences are identified and integrated in the patient's plan of care  Description: PAIN - ADULT  Goal: Verbalizes/displays adequate comfort level or patient's stated pain goal  Description: INTERVENTIONS:  - Encourage pt to monitor pain and request assistance  - Assess pain using appropriate pain scale  - Administer analgesics based on t

## 2021-05-03 PROCEDURE — 99233 SBSQ HOSP IP/OBS HIGH 50: CPT | Performed by: HOSPITALIST

## 2021-05-03 PROCEDURE — 99232 SBSQ HOSP IP/OBS MODERATE 35: CPT | Performed by: INTERNAL MEDICINE

## 2021-05-03 NOTE — CM/SW NOTE
CM self-referred to meet w/ pt due to case finding and diagnosis. CM to discuss eventual discharge planning needs. Bedside visist deferred to save PPE. Attempted call to pt's room no answer, left  message on cell ph#.      DX:  Acute hypoxemic respirat

## 2021-05-03 NOTE — PROGRESS NOTES
[facility]    Progress Note    Radha Anthony Patient Status:  Inpatient    3/15/1970 MRN [id number]   Location [address] [facility] Attending Virgilio Rowan MD   Hosp Day # 5 PCP Diane Gomes DO     CC: SOB  Subjective:   Bro Agrawal Sodium Phosphate  6 mg Intravenous Daily       Current PRN Inpatient Meds:      LORazepam, guaiFENesin-codeine, Saline Nasal Spray, ondansetron HCl, Prochlorperazine Edisylate, sodium chloride 0.9%, Albuterol Sulfate HFA, benzonatate, acetaminophen    Resu markers trending down.   -monitor lfts on remdesivir trending down.   -Cont Lovenox for VTe prophylaxis. - ID and Pulm following.  - therapeutic proning. HTN  - Cont amlodipine and enalapril. Hyperlipidemia  - resume statin.         Quality:  · Yusrafrmiley Craw

## 2021-05-03 NOTE — PROGRESS NOTES
[facility]    Progress Note    Jimenez Ortiz Patient Status:  Inpatient    3/15/1970 MRN [id number]   Location [facility] Attending Jane Rolle MD   Hosp Day # 5 PCP Stephanie Guthrie,      HPI/Subjective:     Constituti bilateral infiltrate  Patient received Genny Murray vaccination on 5/8/2021  O2 requirement at 5-6 L nasal cannula down from 15 L  High inflammatory markers  Claimed improvement      S/p Actemra  Remdesivir  Decadron  Lovenox subcutaneous     Supporti

## 2021-05-03 NOTE — PLAN OF CARE
Pt's vital signs stable. Alert and oriented x4. Denies pain. Tele #104 in place, NSR-ST. On 6L of O2 via nasal cannula. Sating 92-98%. Still states having dyspnea with exertion. Tolerating diet. Voids freely. Ambulates independently.  Lovenox for DVT prophy broncho-pulmonary hygiene including cough, deep breathe, Incentive Spirometry  - Assess the need for suctioning and perform as needed  - Assess and instruct to report SOB or any respiratory difficulty  - Respiratory Therapy support as indicated  - Manage/a responsible for managing their own health  - Refer to Case Management Department for coordinating discharge planning if the patient needs post-hospital services based on physician/LIP order or complex needs related to functional status, cognitive ability o

## 2021-05-03 NOTE — PROGRESS NOTES
Pulmonary/Critical Care Follow Up Note    HPI:   Trevor Loaiza is a 46year old male with Patient presents with:  Difficulty Breathing  Covid      PCP Christal Bacon DO  Admission Attending Miriam Farah MD    Hospital Day #4    Still SOB but bet EXAM:   Blood pressure 107/74, pulse 91, temperature 98 °F (36.7 °C), temperature source Oral, resp. rate 18, height 5' 8\" (1.727 m), weight 218 lb 6.4 oz (99.1 kg), SpO2 94 %.     Intake/Output Summary (Last 24 hours) at 5/2/2021 0030  Last data filed at

## 2021-05-03 NOTE — PLAN OF CARE
Patient is alert and oriented x4. Weaned to 5L HFNC. Dyspnea with exertion. Self proning as tolerated. PO decadron continued. Patient ambulating independently. Call light and personal belongings within reach.      Problem: Patient Centered Care  Goal: Xin anxiety  - Monitor for signs/symptoms of CO2 retention  Outcome: Progressing     Problem: PAIN - ADULT  Goal: Verbalizes/displays adequate comfort level or patient's stated pain goal  Description: INTERVENTIONS:  - Encourage pt to monitor pain and request support system  Outcome: Progressing

## 2021-05-04 PROCEDURE — 99233 SBSQ HOSP IP/OBS HIGH 50: CPT | Performed by: HOSPITALIST

## 2021-05-04 PROCEDURE — 99232 SBSQ HOSP IP/OBS MODERATE 35: CPT | Performed by: INTERNAL MEDICINE

## 2021-05-04 NOTE — PROGRESS NOTES
[facility]    Progress Note    Socorro Mcclelland Patient Status:  Inpatient    3/15/1970 MRN [id number]   Location [address] [facility] Attending Kajal Velasquez MD   Hosp Day # 6 PCP Cas Treviño DO     CC: SOB  Subjective:   Jon May Current PRN Inpatient Meds:      LORazepam, guaiFENesin-codeine, Saline Nasal Spray, ondansetron HCl, Prochlorperazine Edisylate, sodium chloride 0.9%, Albuterol Sulfate HFA, benzonatate, acetaminophen    Results:     Recent Labs   Lab 04/28/21  1811 mg daily PO   -cont pepcid, zinc, melatonin  -Remdesivir Day #5/5   - s/p Actemra 4/29.   - Not candidate for CCP.   - s/p J&J vaccine 4/8   -daily labs including inflammatory markers which are trending down.   -monitor lfts on remdesivir-trending down.

## 2021-05-04 NOTE — CM/SW NOTE
CM followed up on possible out of pocket cost to patient if home O2 required. Per pt & spouse/Shanon they agree to pay out of pocket cost /self pay of $125.00 per month. CM spoke with Mounika/JANETH liaison pt is not ready for discharge.     Per nursing pt

## 2021-05-04 NOTE — PLAN OF CARE
Patient alert and oriented, reports no pain, is able to tolerate 4L of oxygen at rest, 5-6L with light activity, up to chair with set-up assistance this morning. Encouraged patient to get up to the chair, use IS, and to prone when in bed.    Wife updated on respiratory difficulty  - Respiratory Therapy support as indicated  - Manage/alleviate anxiety  - Monitor for signs/symptoms of CO2 retention  Outcome: Progressing     Problem: PAIN - ADULT  Goal: Verbalizes/displays adequate comfort level or patient's sta order or complex needs related to functional status, cognitive ability or social support system  Outcome: Progressing

## 2021-05-04 NOTE — PLAN OF CARE
PO decadron continues. Patient self prones as needed. Needed to turn O2 NC up to 6L as when sleeping was going down into the middle 80's on 4L. Gave ativan IVP tonight as patient felt nervous. Lovenox continues. Monitoring.       Problem: Patient Centered C Manage/alleviate anxiety  - Monitor for signs/symptoms of CO2 retention  Outcome: Progressing     Problem: PAIN - ADULT  Goal: Verbalizes/displays adequate comfort level or patient's stated pain goal  Description: INTERVENTIONS:  - Encourage pt to monitor or social support system  Outcome: Progressing

## 2021-05-04 NOTE — CM/SW NOTE
Care Progression Note:  Length of Stay    Active Acute Medical Issue:    Severe COVID - 19 Pneumonia    Other Contributing Medical Factors/Dx. :    Acute hypoxemic respiratory failure  Hypoxia    Length of stay: 6  GMLOS:  5.4    Avoidable Delays:  none

## 2021-05-04 NOTE — DIETARY NOTE
ADULT NUTRITION INITIAL ASSESSMENT    Pt is at moderate nutrition risk. Pt does not meet malnutrition criteria.       RECOMMENDATIONS TO MD:  See Nutrition Intervention    ADMITTING DIAGNOSIS:   Hypoxia [R09.02]  Pneumonia due to 2019 novel coronavirus [U0 noted per visual exam.  - Fluid Accumulation: none per RN documentation  - Skin Integrity: intact and RN documentation reviewed. ANTHROPOMETRICS:  HT: 172.7 cm (5' 8\")  WT: 98.1 kg (216 lb 3.2 oz)   BMI: Body mass index is 32.87 kg/m².   BMI CLASSIFICAT determined    MONITOR AND EVALUATE/NUTRITION GOALS:  - Food and Nutrient Intake:      Monitor: adequacy of PO intake, tolerance of PO intake, adequacy of supplement intake and tolerance of supplement intake.   - Anthropometric Measurement:      Monitor antonette

## 2021-05-04 NOTE — PROGRESS NOTES
[facility]     Progress Note        Ashvin Guerrero Patient Status:  Inpatient    3/15/1970 MRN [id number]   Location [address] [facility] Attending John Lorenz MD   Hosp Day # 6 PCP Riya Ly, DO       Subjective:   Patient se Oral, TID PRN  acetaminophen (TYLENOL) tab 650 mg, 650 mg, Oral, Q6H PRN        Continuous Infusions:     Physical Exam  Constitutional: no acute distress  Eyes: PERRL  ENT: nares pateint  Neck: supple, no JVD  Cardio: RRR, S1 S2  Respiratory: Bilateral cr

## 2021-05-05 PROCEDURE — 99233 SBSQ HOSP IP/OBS HIGH 50: CPT | Performed by: HOSPITALIST

## 2021-05-05 PROCEDURE — 99232 SBSQ HOSP IP/OBS MODERATE 35: CPT | Performed by: INTERNAL MEDICINE

## 2021-05-05 NOTE — OCCUPATIONAL THERAPY NOTE
*O2 saturation at rest: 94% on 2L, vitals with activity: 88-92%, vitals at end of session: 95-98%    OCCUPATIONAL THERAPY EVALUATION - INPATIENT     Room Number: 504/504-A  Evaluation Date: 5/5/2021  Type of Evaluation: Initial  Presenting Problem:  (COVID supine in bed.    • Instructed in line management for home O2 tubing  • Graded therapeutic activity to challenge current functional level     The patient's Approx Degree of Impairment: 38.32% has been calculated based on documentation in the [facility] '6 clicks saturation 95-98% on 2L.  -102 bpm           COGNITION  Overall Cognitive Status:  WFL - within functional limits      RANGE OF MOTION   Upper extremity ROM is within functional limits     STRENGTH ASSESSMENT  Upper extremity strength is within functi ADL activity at sink maintaining >90% O2 saturation   Comment:              Goals  on:   Frequency: 3-5x a week    9 [facility] MOT/R  [address]  [id number]

## 2021-05-05 NOTE — CM/SW NOTE
Per chart review, PT rec is home health. SW placed referral via [facility]-need final orders/f2f. Plan: Home with home health pending acceptance    429pm  UPDATE: patient is self-pay, will most likely not be able to receive home health services.        David Morris

## 2021-05-05 NOTE — PLAN OF CARE
Patricia Rizzo is observed sleeping. He is easily aroused and alert and orientated x4. The plan is for him to return home with 02 and is agreeable to pay out of pocket for it. Call light within reach.      Problem: Patient Centered Care  Goal: Patient preferences for signs/symptoms of CO2 retention  Outcome: Progressing     Problem: PAIN - ADULT  Goal: Verbalizes/displays adequate comfort level or patient's stated pain goal  Description: INTERVENTIONS:  - Encourage pt to monitor pain and request assistance  - Asses Progressing

## 2021-05-05 NOTE — PHYSICAL THERAPY NOTE
PHYSICAL THERAPY EVALUATION - INPATIENT     Room Number: 709/250-L  Evaluation Date: 5/5/2021  Type of Evaluation: Initial   Physician Order: PT Eval and Treat    Presenting Problem: COVID-19 (+) on 4/24/21; pneumonia; hypoxia  Reason for Therapy: [facility] '6 clicks' Inpatient Basic Mobility Short Form. Research supports that patients with this level of impairment may benefit from [address] Patient will benefit from continued IP PT services to address these deficits in preparation for discharge.     DIS [place]: Pt was independent w/ ADLs, amb w/o AD, and working as a . SUBJECTIVE  \"I haven't been walking much. \" (via )    PHYSICAL THERAPY EXAMINATION     OBJECTIVE  Precautions:  (Contact & Droplet)  Fall Risk: Standard fall ris addressed; Family present    CURRENT GOALS    Goals to be met by: 5/19/21  Patient Goal Patient's self-stated goal is: not stated   Goal #1 Patient is able to demonstrate supine - sit EOB @ level: modified independent     Goal #1   Current Status    Goal #2

## 2021-05-05 NOTE — PLAN OF CARE
Patient alert and oriented, on 2L of oxygen throughout the day maintaining 94-97% oxygen saturation at rest and 88-92% with activity (on 2L O2).  Patient reports feeling well, was slightly unsteady when working with PT/OT today and much more stable walking Assess for changes in mentation and behavior  - Position to facilitate oxygenation and minimize respiratory effort  - Oxygen supplementation based on oxygen saturation or ABGs  - Provide Smoking Cessation handout, if applicable  - Encourage broncho-pulmona facility with appropriate resources  Description: INTERVENTIONS:  - Identify barriers to discharge w/pt and caregiver  - Include patient/family/discharge partner in discharge planning  - Arrange for needed discharge resources and transportation as appropri

## 2021-05-05 NOTE — PROGRESS NOTES
[facility]     Progress Note        Elmo Mast Patient Status:  Inpatient    3/15/1970 MRN [id number]   Location [address] [facility] Attending Vicky Méndez MD   Hosp Day # 7 PCP Tr Scott DO       Subjective:   Patient se Oral, TID PRN  acetaminophen (TYLENOL) tab 650 mg, 650 mg, Oral, Q6H PRN        Continuous Infusions:     Physical Exam  Constitutional: no acute distress  Eyes: PERRL  ENT: nares pateint  Neck: supple, no JVD  Cardio: RRR, S1 S2  Respiratory: Bilateral cr

## 2021-05-06 VITALS
TEMPERATURE: 98 F | SYSTOLIC BLOOD PRESSURE: 98 MMHG | DIASTOLIC BLOOD PRESSURE: 63 MMHG | WEIGHT: 215 LBS | OXYGEN SATURATION: 94 % | HEIGHT: 68 IN | HEART RATE: 90 BPM | RESPIRATION RATE: 18 BRPM | BODY MASS INDEX: 32.58 KG/M2

## 2021-05-06 PROCEDURE — 99239 HOSP IP/OBS DSCHRG MGMT >30: CPT | Performed by: HOSPITALIST

## 2021-05-06 PROCEDURE — 99232 SBSQ HOSP IP/OBS MODERATE 35: CPT | Performed by: INTERNAL MEDICINE

## 2021-05-06 PROCEDURE — 99232 SBSQ HOSP IP/OBS MODERATE 35: CPT | Performed by: PHYSICIAN ASSISTANT

## 2021-05-06 NOTE — PROGRESS NOTES
[place] [facility]    Progress Note    Armen Files Patient Status:  Inpatient    3/15/1970 MRN [id number]   Location [address] [facility] Attending Verito Stern, [address] [facility] Day # 8 PCP Darling Belcher DO     HPI/Subjective:   Seen and e vaccine 4/8  -CXR with bilateral opacities  -High inflammation and evidence of transaminitis  -Decadron day #9  -S/p RDV  -S/p baricitinib x1 dose then stopped, s/p Actemra  -Oxygenation improving and feels better - down to 1 L O2  -Plan:  -O2 and wean as

## 2021-05-06 NOTE — PROGRESS NOTES
Patient's O2 sat on room air is ___94_% at rest.   Pt's O2 sat on room is __90__% when ambulating,   and __96__% on __1_ liter while ambulating.

## 2021-05-06 NOTE — PROGRESS NOTES
[place] [facility]     Progress Note        Amber Mention Patient Status:  Inpatient    3/15/1970 MRN [id number]   Location [facility] Attending Jamie Boyd MD   Hosp Day # 8 PCP Angela Lewis DO       Subjective:   Patient se Daily  benzonatate (TESSALON) cap 200 mg, 200 mg, Oral, TID PRN  acetaminophen (TYLENOL) tab 650 mg, 650 mg, Oral, Q6H PRN        Continuous Infusions:     Physical Exam  Constitutional: no acute distress  Eyes: PERRL  ENT: nares pateint  Neck: supple, no

## 2021-05-06 NOTE — CM/SW NOTE
CM received call from [facility] liaison for RN to correct O2 walk test note to reflect as below. CM sent secure chat to RN, RN noted chat.     Documentation for O2 sats: (RN to add to progress note)    Patient's O2 sat on room air is ____% at rest.   Pt

## 2021-05-06 NOTE — PLAN OF CARE
Problem: Patient Centered Care  Goal: Patient preferences are identified and integrated in the patient's plan of care  Description: Interventions:  - What would you like us to know as we care for you?  Home with family  - Provide timely, complete, and acc report SOB or any respiratory difficulty  - Respiratory Therapy support as indicated  - Manage/alleviate anxiety  - Monitor for signs/symptoms of CO2 retention  5/6/2021 1445 by Ravi Bear RN  Outcome: Adequate for Discharge  5/6/2021 1254 by Jorge Blackburn needed  - Consider post-discharge preferences of patient/family/discharge partner  - Complete POLST form as appropriate  - Assess patient's ability to be responsible for managing their own health  - Refer to Case Management Department for coordinating disc

## 2021-05-06 NOTE — PROGRESS NOTES
[facility]    Progress Note    Shantal Rodriguez Patient Status:  Inpatient    3/15/1970 MRN [id number]   Location [address] [facility] Attending Jessica Screen, [address] [facility] Day # 7 PCP Nisha Forbes DO       Subjective:   Chuy Mac mg, Oral, Daily   Or  dexamethasone Sodium Phosphate (DECADRON) 4 MG/ML injection 6 mg, 6 mg, Intravenous, Daily  benzonatate (TESSALON) cap 200 mg, 200 mg, Oral, TID PRN  acetaminophen (TYLENOL) tab 650 mg, 650 mg, Oral, Q6H PRN        Lab Results   [place] Lovenox for VTe prophylaxis.    - ID and Pulm following.  - therapeutic proning.      HTN  - stable   - Cont amlodipine and enalapril.      Hyperlipidemia  - resume statin.          Quality:  · Diet: general   · PT/OT: deferred  · DVT Prophylaxis: lovenox

## 2021-05-06 NOTE — PLAN OF CARE
Rebeka Anthony is alert and orientated x4. He presents as calm and cooperative. His 02 saturations remain stable on 1 L of oxygen via nasal cannula. Vital signs remain stable. The tentative plan is for patient to discharge home. Call light placed within reach. support as indicated  - Manage/alleviate anxiety  - Monitor for signs/symptoms of CO2 retention  Outcome: Progressing     Problem: PAIN - ADULT  Goal: Verbalizes/displays adequate comfort level or patient's stated pain goal  Description: INTERVENTIONS:  - status, cognitive ability or social support system  Outcome: Progressing

## 2021-05-06 NOTE — OCCUPATIONAL THERAPY NOTE
OCCUPATIONAL THERAPY TREATMENT NOTE - INPATIENT        Room Number: 767/990-X           Presenting Problem:  (COVID+)    Problem List  Principal Problem:    Pneumonia due to 2019 novel coronavirus  Active Problems:    Hypoxia      OCCUPATIONAL THERAPY ASSE Putting on and taking off regular lower body clothing?: A Little  -   Bathing (including washing, rinsing, drying)?: A Little  -   Toileting, which includes using toilet, bedpan or urinal? : None  -   Putting on and taking off regular upper body clothing?:

## 2021-05-06 NOTE — PHYSICAL THERAPY NOTE
PHYSICAL THERAPY TREATMENT NOTE - INPATIENT     Room Number: 375/059-I       Presenting Problem: COVID-19 (+) on 4/24/21; pneumonia; hypoxia    Problem List  Principal Problem:    Pneumonia due to 2019 novel coronavirus  Active Problems:    Hypoxia      PH Static Sitting: Good  Dynamic Sitting: Good           Static Standing: Good  Dynamic Standing: Fair +    ACTIVITY TOLERANCE                         O2 WALK       AM-PAC '6-Clicks' INPATIENT SHORT assistive device and supervision   Goal #4   Current Status NT   Goal #5 Patient to demonstrate independence with home activity/exercise instructions provided to patient in preparation for discharge.    Goal #5   Current Status IN PROGRESS   Goal #6     [facility]

## 2021-05-07 ENCOUNTER — TELEPHONE (OUTPATIENT)
Dept: INTERNAL MEDICINE CLINIC | Facility: CLINIC | Age: 51
End: 2021-05-07

## 2021-05-07 ENCOUNTER — PATIENT OUTREACH (OUTPATIENT)
Dept: CASE MANAGEMENT | Age: 51
End: 2021-05-07

## 2021-05-07 DIAGNOSIS — Z02.9 ENCOUNTERS FOR UNSPECIFIED ADMINISTRATIVE PURPOSE: ICD-10-CM

## 2021-05-07 NOTE — PROGRESS NOTES
Initial Post Discharge Follow Up   Discharge Date: 5/6/21  Contact Date: 5/7/2021    Consent Verification:  Assessment Completed With: Caregiver: Ray Permission received per patient?  verbal and patient   HIPAA Verified?   Yes    Discharge Dx:      Acute was your diagnoses explained to you? Yes  • Do you have any questions about your diagnoses? No  • Are you able to perform normal daily activities of living as you have prior to your hospital stay (dressing, bathing, ambulating to the bathroom, etc)?  yes; N home, are there any needs or concerns you need addressed before your next visit with your PCP?  (DME, meds, disease concerns, Etc): No     Follow up appointments:      Your appointments     Date & Time Appointment Department [facility])    May 10, 2021  1:00 appointment with pt:  Yes; a virutal appointment was scheduled for 5/12/2021. The patient declined a sooner appointment when offered by the [facility]. A TE was sent to the PCP's office for an appointment review.        Have you made all of your follow up appointm

## 2021-05-07 NOTE — TELEPHONE ENCOUNTER
Called patient and noted he was already scheduled for Tuesday, May 11 with a VIDEO call. Patient does not have Mob[employer] or a computer! Changed it to a PHONE call. Unable to do a visit on May10 as he has another appointment on Monday.

## 2021-05-07 NOTE — TELEPHONE ENCOUNTER
Spoke to the pt and son today for a HFU. The patient was recently hospitalized for COVID/PNA. The patient does have a virtual HFU appt scheduled for 5/12/2021. A HFU appt is recommended by 5/10/2021, as the pt is a high risk for readmission.  The patient de

## 2021-05-09 NOTE — DISCHARGE SUMMARY
[place] [facility]    Discharge Summary    Emi Aguilar Patient Status:  Inpatient    3/15/1970 MRN [id number]   Location [address] [facility] Attending No att. providers found   [facility] Day [facility] PCP Aurelia Prieto DO     Date of Admission:  Chest x-ray showed bilateral upper and lower lobe opacities. Findings secondary to pulmonary multifocal pneumonia. CBC, C-reactive protein, LDH, ferritin, and D-dimer were ordered. Patient will be admitted to the hospital for further management.   With h 90 tablet  Refills: 1            Follow up Visits:  Follow-up with Dr Damien Arellano  in 1 week    Follow up Labs: none     Other Discharge Instructions: none    Aleksandra Simmons DO  5/9/2021  6:05 PM    > 35 min

## 2021-05-10 ENCOUNTER — TELEMEDICINE (OUTPATIENT)
Dept: CARDIOLOGY CLINIC | Facility: HOSPITAL | Age: 51
End: 2021-05-10
Payer: OTHER GOVERNMENT

## 2021-05-10 DIAGNOSIS — J12.82 PNEUMONIA DUE TO 2019 NOVEL CORONAVIRUS: Primary | ICD-10-CM

## 2021-05-10 DIAGNOSIS — U07.1 PNEUMONIA DUE TO 2019 NOVEL CORONAVIRUS: Primary | ICD-10-CM

## 2021-05-10 PROCEDURE — 99203 OFFICE O/P NEW LOW 30 MIN: CPT | Performed by: CLINICAL NURSE SPECIALIST

## 2021-05-10 NOTE — PROGRESS NOTES
[facility] Patient Status:  No patient class for patient encounter    3/15/1970 MRN [id number]   Location [address] , Radha Lozdaa is a 46year old male who pres TROP <0.045 04/28/2021 06:10 PM     04/28/2021 06:10 PM         General appearance: Appears appropriate, alert and stated age.   No respiratory distress noted on virtual exam.  Neck: unable to assess due to virtual visit  Lungs: unable to assess du COVID19.       Taisha Julio, RICARDO  5/10/2021  1:41 PM

## 2021-05-10 NOTE — PATIENT INSTRUCTIONS
Hold enalapril and amlodipine tomorrow. If dizziness and \"blurry\" vision improve, let your primary care doctor know. Continue to monitor oxygen saturation levels. If oxygen levels <90%, please call [address] or go to ED.

## 2021-05-11 ENCOUNTER — TELEMEDICINE (OUTPATIENT)
Dept: INTERNAL MEDICINE CLINIC | Facility: CLINIC | Age: 51
End: 2021-05-11

## 2021-05-11 DIAGNOSIS — Z09 HOSPITAL DISCHARGE FOLLOW-UP: Primary | ICD-10-CM

## 2021-05-11 DIAGNOSIS — J12.82 PNEUMONIA DUE TO COVID-19 VIRUS: ICD-10-CM

## 2021-05-11 DIAGNOSIS — M72.2 PLANTAR FASCIITIS: ICD-10-CM

## 2021-05-11 DIAGNOSIS — U07.1 PNEUMONIA DUE TO COVID-19 VIRUS: ICD-10-CM

## 2021-05-11 DIAGNOSIS — Z86.16 PERSONAL HISTORY OF COVID-19: ICD-10-CM

## 2021-05-11 PROCEDURE — 99213 OFFICE O/P EST LOW 20 MIN: CPT | Performed by: FAMILY MEDICINE

## 2021-05-11 NOTE — PROGRESS NOTES
Telehealth outside of [address] Verbal Consent   I conducted a telehealth visit with Elmo Mast today, 05/11/21, which was completed using two-way, real-time interactive audio and video communication.  This has been done in good geovanna to infection, now s/p hospitalization. Hospitalized from 04/28-05/6 for covid. Needed up to 15L of high flow and given remdesivir x 5 days. Since discharge doing well. At times somewhat fatigued but overall well.   No more fevers, no congestion, cough virus    3. Personal history of COVID-19    4. Plantar fasciitis    1-3. Doing well, reviewed hospital course and discharge summary, reviewed and reconciled medications. 2. Advised supportive care and to wear supportive shoes even while in the home.

## 2021-05-11 NOTE — TELEPHONE ENCOUNTER
Phone visit changed to virtual Epic video visit. Patient's son did confirm the cell phone listed is a smart phone. The son will be present to assist the patient with the scheduled appointment.

## 2021-06-10 ENCOUNTER — TELEPHONE (OUTPATIENT)
Dept: INTERNAL MEDICINE CLINIC | Facility: CLINIC | Age: 51
End: 2021-06-10

## 2021-06-10 DIAGNOSIS — I10 ESSENTIAL HYPERTENSION: ICD-10-CM

## 2021-06-10 RX ORDER — ENALAPRIL MALEATE 10 MG/1
10 TABLET ORAL DAILY
Qty: 90 TABLET | Refills: 0 | Status: SHIPPED | OUTPATIENT
Start: 2021-06-10 | End: 2021-09-21

## 2021-06-10 NOTE — TELEPHONE ENCOUNTER
Requesting refill on Enalapril. Has not formally established with Dr. Sera Castelan. Saw our NP for vaccine only. Had one telemedicine call with Dr. Sera Castelan for Hospital F/U.     Informed patient that he needs to be examined by a physician to continue getting refi

## 2021-09-21 ENCOUNTER — OFFICE VISIT (OUTPATIENT)
Dept: INTERNAL MEDICINE CLINIC | Facility: CLINIC | Age: 51
End: 2021-09-21

## 2021-09-21 VITALS
HEART RATE: 62 BPM | BODY MASS INDEX: 35 KG/M2 | SYSTOLIC BLOOD PRESSURE: 100 MMHG | DIASTOLIC BLOOD PRESSURE: 70 MMHG | WEIGHT: 228 LBS | OXYGEN SATURATION: 97 %

## 2021-09-21 DIAGNOSIS — I10 ESSENTIAL HYPERTENSION: ICD-10-CM

## 2021-09-21 PROCEDURE — 3074F SYST BP LT 130 MM HG: CPT | Performed by: FAMILY MEDICINE

## 2021-09-21 PROCEDURE — 3078F DIAST BP <80 MM HG: CPT | Performed by: FAMILY MEDICINE

## 2021-09-21 PROCEDURE — 99213 OFFICE O/P EST LOW 20 MIN: CPT | Performed by: FAMILY MEDICINE

## 2021-09-21 RX ORDER — ENALAPRIL MALEATE 10 MG/1
10 TABLET ORAL DAILY
Qty: 30 TABLET | Refills: 1 | Status: SHIPPED | OUTPATIENT
Start: 2021-09-21 | End: 2021-12-08

## 2021-09-21 RX ORDER — ATORVASTATIN CALCIUM 10 MG/1
10 TABLET, FILM COATED ORAL NIGHTLY
Qty: 30 TABLET | Refills: 11 | Status: SHIPPED | OUTPATIENT
Start: 2021-09-21

## 2021-09-21 NOTE — PROGRESS NOTES
PATIENT IDENTIFICATION  Name: Elmo Mast  MRN: [id number]    Diagnoses:   Essential hypertension    SUBJECTIVE:  Elmo Mast is a 46year old male who presents for BP follow up.      Endorses compliance with enalapril but recently ran out return and ED precautions and endorsed understanding.      Karen Bruton MD

## 2021-12-04 DIAGNOSIS — I10 ESSENTIAL HYPERTENSION: ICD-10-CM

## 2021-12-08 RX ORDER — ENALAPRIL MALEATE 10 MG/1
TABLET ORAL
Qty: 90 TABLET | Refills: 2 | Status: SHIPPED | OUTPATIENT
Start: 2021-12-08

## 2021-12-13 NOTE — H&P
[address] [facility] [address] Gastroenterology                                                                                                  Clinic History and Physical     Pa Drug use: No       Medications (Active prior to today's visit):  Current Outpatient Medications   Medication Sig Dispense Refill   • PEG 3350-KCl-Na Bicarb-NaCl (TRILYTE) 420 g Oral Recon Soln Take prep as directed by gastro office.  May substitute with Tr quadrants without rigidity or guarding, non-distended, no abnormal bowel sounds noted, no masses are palpated, rectal exam performed w/ Aurea (SHANTA) in attendance, external exam showed no evidence of perianal disease, internal exam revealed normal sphincte up with PCP. Patient advised if BP elevated on day of procedure(s), it is MD's discretion that the procedure may be cancelled.            Recommend:  -Schedule colonoscopy w/ Dr. Danyelle Hart with MAC or Dr. Tabitha Ramos with IV twilight or MAC  -Eligible for NE: substitute with Trilyte/generic equivalent if needed   • hydrocortisone 2.5 % External Cream 1 each 0     Sig: Place 1 Application rectally 2 (two) times daily for 7 days.  Apply by topical route 2 times every day to the affected area(s)       Imaging & Ref

## 2021-12-27 ENCOUNTER — TELEPHONE (OUTPATIENT)
Dept: GASTROENTEROLOGY | Facility: CLINIC | Age: 51
End: 2021-12-27

## 2021-12-27 ENCOUNTER — OFFICE VISIT (OUTPATIENT)
Dept: GASTROENTEROLOGY | Facility: CLINIC | Age: 51
End: 2021-12-27
Payer: COMMERCIAL

## 2021-12-27 VITALS
DIASTOLIC BLOOD PRESSURE: 82 MMHG | HEART RATE: 89 BPM | BODY MASS INDEX: 34.71 KG/M2 | HEIGHT: 68 IN | WEIGHT: 229 LBS | SYSTOLIC BLOOD PRESSURE: 140 MMHG

## 2021-12-27 DIAGNOSIS — L29.0 RECTAL ITCHING: ICD-10-CM

## 2021-12-27 DIAGNOSIS — Z12.11 SCREENING FOR COLON CANCER: Primary | ICD-10-CM

## 2021-12-27 DIAGNOSIS — Z12.11 SCREEN FOR COLON CANCER: Primary | ICD-10-CM

## 2021-12-27 DIAGNOSIS — I10 ESSENTIAL HYPERTENSION: ICD-10-CM

## 2021-12-27 DIAGNOSIS — L29.0 ANAL ITCHING: ICD-10-CM

## 2021-12-27 PROCEDURE — 3079F DIAST BP 80-89 MM HG: CPT | Performed by: NURSE PRACTITIONER

## 2021-12-27 PROCEDURE — 3077F SYST BP >= 140 MM HG: CPT | Performed by: NURSE PRACTITIONER

## 2021-12-27 PROCEDURE — 99244 OFF/OP CNSLTJ NEW/EST MOD 40: CPT | Performed by: NURSE PRACTITIONER

## 2021-12-27 PROCEDURE — 3008F BODY MASS INDEX DOCD: CPT | Performed by: NURSE PRACTITIONER

## 2021-12-27 RX ORDER — HYDROCORTISONE 25 MG/G
1 CREAM TOPICAL 2 TIMES DAILY
Qty: 1 EACH | Refills: 0 | Status: SHIPPED | OUTPATIENT
Start: 2021-12-27 | End: 2022-01-03

## 2021-12-27 RX ORDER — POLYETHYLENE GLYCOL 3350, SODIUM CHLORIDE, SODIUM BICARBONATE, POTASSIUM CHLORIDE 420; 11.2; 5.72; 1.48 G/4L; G/4L; G/4L; G/4L
POWDER, FOR SOLUTION ORAL
Qty: 4000 ML | Refills: 0 | Status: SHIPPED | OUTPATIENT
Start: 2021-12-27

## 2021-12-27 RX ORDER — GABAPENTIN 100 MG/1
100 CAPSULE ORAL
COMMUNITY
Start: 2021-05-14 | End: 2022-05-09

## 2021-12-27 NOTE — TELEPHONE ENCOUNTER
Scheduled for:  Colonoscopy [address]  Provider Name:  Dr Guallpa Host  Date:   Wednesday, 01/04/2022  Location:  [facility]  Sedation:  MAC  Time:  1:00pm ( pt is aware arrival time is at 12:00pm)  Prep:  Trilyte  Meds/Allergies Reconciled?: RICARDO Arias Reviewed 77

## 2021-12-27 NOTE — PATIENT INSTRUCTIONS
-Schedule colonoscopy w/ Dr. Chacorta Cole with MAC or Dr. Yanci Souza with IV twilight or MAC  Dx: screening, rectal itching   -Eligible for NE: Yes r/t BMI <40  -Prep: Split dose Colyte/TriLyte or equivalent  -Anti-platelets and anti-coagulants: None  -Diabetes

## 2021-12-27 NOTE — TELEPHONE ENCOUNTER
Per Availity, member is inactive. Please contact patient and confirm/update benefit information. Thank you.

## 2021-12-29 NOTE — TELEPHONE ENCOUNTER
I spoke to the pt    He is going to call his wife Leia Monge.  She is the person who the insurance is with    He will call me back tomorrow with an update

## 2021-12-30 NOTE — TELEPHONE ENCOUNTER
Mildred Bueno,    I spoke to [employer].  His mother called the insurance yesterday and they corrected the issue  Please use reference # [id number]

## 2021-12-30 NOTE — ED INITIAL ASSESSMENT (HPI)
Patient diagnosed with Covid on Saturday. Patient states that he has been feeling increasingly short of breath since then.  Patient 81% on RA upon arrival. Patient placed on 6L O2 NC. You can access the FollowMyHealth Patient Portal offered by [facility] by registering at the following website: [email or web address]. By joining [facility]’s FollowMyHealth portal, you will also be able to view your health information using other applications (apps) compatible with our system.

## 2021-12-30 NOTE — TELEPHONE ENCOUNTER
Per office, eligibility has been corrected. Still not eligible per [employer]. [facility], [phone], spoke with Adele Bear PA is required for diagnostic Ihsan Smith 65, [id number]. Patient will not become active until 1/1/2022.  Takes 5-7 days to review [facility], [place]) No

## 2021-12-31 NOTE — TELEPHONE ENCOUNTER
Schedulers:  Please assist patient with scheduling colonoscopy to a later date because we will not have authorization in time for procedure on 1/4/2022. The orders are located in office visit with Jon Alvarado dated 12/27/2021.     I spoke to patient a answered to the patient’s satisfaction. The patient signed informed consent and elected to proceed with colonoscopy with intervention [i.e. polypectomy, stent placement, etc.] as indicated.

## 2021-12-31 NOTE — TELEPHONE ENCOUNTER
Scheduled for:  Colonoscopy - [address]7553  Provider Name:  Dr. Renetta Michel  Date:  4/26/22  Location:  [facility]  Sedation:  MAC  Time:  1:00 pm (pt is aware to arrive at 12:00 pm)  Prep:  Trilyte, Prep instructions were given to pt over the phone, pt verbalized unde

## 2022-10-06 DIAGNOSIS — I10 ESSENTIAL HYPERTENSION: ICD-10-CM

## 2022-10-06 RX ORDER — ENALAPRIL MALEATE 10 MG/1
10 TABLET ORAL DAILY
Qty: 90 TABLET | Refills: 0 | Status: SHIPPED | OUTPATIENT
Start: 2022-10-06

## 2022-10-06 NOTE — TELEPHONE ENCOUNTER
Patient stopped into the office and is requesting a refill for   ENALAPRIL 10 MG Oral Tab 90 tablet   Patient is stating he is out of refills and has made an appt to see Dr. Lazarus Barrow for 10-. Patient pharmacy-[address].  [phone], [phone]

## (undated) NOTE — Clinical Note
HFU call completed. A VV is scheduled for 5/12/2021. The patient declined a sooner appointment. Thank you.

## (undated) NOTE — LETTER
Date: 5/11/2021    Patient Name: Radha Anthony          To Whom it may concern: This letter has been written at the patient's request. The above patient was seen at the [facility] for treatment of a medical condition.     Trini Castelan